# Patient Record
Sex: FEMALE | Race: WHITE
[De-identification: names, ages, dates, MRNs, and addresses within clinical notes are randomized per-mention and may not be internally consistent; named-entity substitution may affect disease eponyms.]

---

## 2023-08-24 ENCOUNTER — HOSPITAL ENCOUNTER
Dept: HOSPITAL 101 - MAMMO | Age: 63
Discharge: HOME | End: 2023-08-24
Payer: COMMERCIAL

## 2023-08-24 DIAGNOSIS — Z12.31: Primary | ICD-10-CM

## 2023-08-24 PROCEDURE — 77063 BREAST TOMOSYNTHESIS BI: CPT

## 2023-08-24 PROCEDURE — 77067 SCR MAMMO BI INCL CAD: CPT

## 2023-08-24 NOTE — MM_ITS
Patient:     TONY CHAPARRO     Exam Date:     2023 
:     1960          Gender:F     MRN:     [id number] 
Ordering :     DR JAMES LERNER M.D.     Admission #:     [id number] 
Family :          Order #:     [id number] 
     CLICK HERE TO VIEW EXAM 
  
RADIOLOGY REPORT 
  
PROCEDURE:     MM TOMOSYNTHESIS SCREENING BI 
  
COMPARISON:     [facility] SCREEN JOHN W CAD, 2017.  [facility] JOHN SCRN W CAD  
DIG, 2014. 
  
INDICATIONS:     Screening 
  
Calculator Name             NCI Breast Cancer Risk Assessment Tool  
5 Year Breast Cancer Risk     2.00% 
Lifetime Breast Cancer Risk     9.00% 
Personal Breast Cancer      No 
Personal Ovarian Cancer     No 
Treatments     None 
Family Cancers     None 
  
LOCATION:       [facility]  
  
BREAST COMPOSITION:     Heterogeneously dense,which may obscure small masses. 
  
FINDINGS:       
DIAGNOSTIC CATEGORY 2--BENIGN FINDING:   
  
RIGHT BREAST:  No significant suspicious finding.  Scattered benign-appearing  
lymph nodes are present.  No significant change has occurred.   
  
LEFT BREAST:  No significant suspicious finding.  Scattered benign-appearing  
lymph nodes are present.  No significant change has occurred.   
  
RECOMMENDATIONS:      
ROUTINE MAMMOGRAM AND CLINICAL EVALUATION IN 12 MONTHS.   
  
PLEASE NOTE:  A NORMAL MAMMOGRAM DOES NOT EXCLUDE THE POSSIBILITY OF BREAST  
CANCER.  A CLINICALLY SUSPICIOUS PALPABLE LUMP SHOULD BE BIOPSIED.   
  
  
Dictated by: Tone Cooper M.D. on 2023 at 14:29      
Approved by: Tone Cooper M.D. on 2023 at 14:32

## 2024-01-18 PROBLEM — E78.2 MIXED HYPERLIPIDEMIA: Status: ACTIVE | Noted: 2024-01-18

## 2024-01-18 PROBLEM — M25.561 CHRONIC PAIN OF BOTH KNEES: Status: ACTIVE | Noted: 2024-01-18

## 2024-01-18 PROBLEM — I10 ESSENTIAL HYPERTENSION, BENIGN: Status: ACTIVE | Noted: 2024-01-18

## 2024-01-18 PROBLEM — H40.1131 PRIMARY OPEN ANGLE GLAUCOMA (POAG) OF BOTH EYES, MILD STAGE: Status: ACTIVE | Noted: 2024-01-18

## 2024-01-18 PROBLEM — M25.562 CHRONIC PAIN OF BOTH KNEES: Status: ACTIVE | Noted: 2024-01-18

## 2024-01-18 PROBLEM — R06.00 DYSPNEA: Status: ACTIVE | Noted: 2024-01-18

## 2024-01-18 PROBLEM — H25.13 CATARACT, NUCLEAR SCLEROTIC, BOTH EYES: Status: ACTIVE | Noted: 2024-01-18

## 2024-01-18 PROBLEM — G89.29 CHRONIC PAIN OF BOTH KNEES: Status: ACTIVE | Noted: 2024-01-18

## 2024-01-18 PROBLEM — Z95.2 S/P TAVR (TRANSCATHETER AORTIC VALVE REPLACEMENT): Status: ACTIVE | Noted: 2024-01-18

## 2024-01-18 RX ORDER — VALSARTAN 320 MG/1
320 TABLET ORAL DAILY
COMMUNITY

## 2024-01-18 RX ORDER — GABAPENTIN 100 MG/1
2 CAPSULE ORAL 2 TIMES DAILY
COMMUNITY

## 2024-01-18 RX ORDER — ROPINIROLE 0.25 MG/1
0.25 TABLET, FILM COATED ORAL SEE ADMIN INSTRUCTIONS
COMMUNITY

## 2024-01-18 RX ORDER — ASPIRIN 81 MG/1
81 TABLET ORAL DAILY
COMMUNITY

## 2024-01-18 RX ORDER — MELOXICAM 15 MG/1
15 TABLET ORAL DAILY
COMMUNITY

## 2024-01-18 RX ORDER — DICYCLOMINE HYDROCHLORIDE 20 MG/1
20 TABLET ORAL
COMMUNITY

## 2024-01-18 RX ORDER — LEVOTHYROXINE SODIUM 100 UG/1
100 TABLET ORAL
COMMUNITY

## 2024-01-18 RX ORDER — TIZANIDINE HYDROCHLORIDE 4 MG/1
4 CAPSULE, GELATIN COATED ORAL NIGHTLY
COMMUNITY
End: 2024-02-23 | Stop reason: ALTCHOICE

## 2024-01-18 RX ORDER — AMLODIPINE BESYLATE 10 MG/1
10 TABLET ORAL DAILY
COMMUNITY

## 2024-01-18 RX ORDER — ATORVASTATIN CALCIUM 10 MG/1
10 TABLET, FILM COATED ORAL DAILY
COMMUNITY

## 2024-01-18 RX ORDER — FERROUS SULFATE 325(65) MG
65 TABLET, DELAYED RELEASE (ENTERIC COATED) ORAL
COMMUNITY
End: 2024-02-23 | Stop reason: ALTCHOICE

## 2024-01-18 RX ORDER — CHLORTHALIDONE 25 MG/1
25 TABLET ORAL DAILY
COMMUNITY
End: 2024-05-22

## 2024-02-23 ENCOUNTER — OFFICE VISIT (OUTPATIENT)
Dept: CARDIOLOGY | Facility: CLINIC | Age: 64
End: 2024-02-23
Payer: COMMERCIAL

## 2024-02-23 VITALS
HEIGHT: 68 IN | WEIGHT: 232 LBS | DIASTOLIC BLOOD PRESSURE: 68 MMHG | SYSTOLIC BLOOD PRESSURE: 138 MMHG | BODY MASS INDEX: 35.16 KG/M2 | HEART RATE: 72 BPM

## 2024-02-23 DIAGNOSIS — Z95.2 S/P TAVR (TRANSCATHETER AORTIC VALVE REPLACEMENT): ICD-10-CM

## 2024-02-23 DIAGNOSIS — I10 ESSENTIAL HYPERTENSION, BENIGN: Primary | ICD-10-CM

## 2024-02-23 DIAGNOSIS — R06.00 DYSPNEA, UNSPECIFIED TYPE: ICD-10-CM

## 2024-02-23 DIAGNOSIS — E78.2 MIXED HYPERLIPIDEMIA: ICD-10-CM

## 2024-02-23 PROCEDURE — 3075F SYST BP GE 130 - 139MM HG: CPT | Performed by: INTERNAL MEDICINE

## 2024-02-23 PROCEDURE — 3008F BODY MASS INDEX DOCD: CPT | Performed by: INTERNAL MEDICINE

## 2024-02-23 PROCEDURE — 99213 OFFICE O/P EST LOW 20 MIN: CPT | Performed by: INTERNAL MEDICINE

## 2024-02-23 PROCEDURE — 3078F DIAST BP <80 MM HG: CPT | Performed by: INTERNAL MEDICINE

## 2024-02-23 RX ORDER — PREDNISOLONE ACETATE 10 MG/ML
1 SUSPENSION/ DROPS OPHTHALMIC 4 TIMES DAILY
COMMUNITY

## 2024-02-23 RX ORDER — LATANOPROST 50 UG/ML
1 SOLUTION/ DROPS OPHTHALMIC NIGHTLY
COMMUNITY

## 2024-02-23 RX ORDER — BRIMONIDINE TARTRATE 2 MG/ML
1 SOLUTION/ DROPS OPHTHALMIC 2 TIMES DAILY
COMMUNITY

## 2024-02-23 RX ORDER — ACETAMINOPHEN 500 MG
500 TABLET ORAL EVERY 6 HOURS PRN
COMMUNITY

## 2024-02-23 ASSESSMENT — ENCOUNTER SYMPTOMS: HYPERTENSION: 1

## 2024-02-23 NOTE — PROGRESS NOTES
"Subjective   Latasha Gallegos is a 63 y.o. female       Chief Complaint    Follow-up; Hypertension     Patient is here for follow-up continue management for hypertension, hyperlipidemia, obesity and aortic valve replacement.  Since last time I saw her she denies any cardiac complaint.  She has lost close to 20 pounds.  She feels much better.  She denies lightheadedness, dizziness or syncope.  She remains active.    Assessment     1. Aortic stenosis status post TAVR done without any complication  2. Hypertension seems to have improved with better control  3. Hyperlipidemia  4. Obesity she lost close to 30 pounds  5.. I discussed with her endocarditis prophylaxis at great length     Plan     1. I advised patient to continue present medical regimen  2. I reviewed her previous lab  3. I encouraged her to lose weight, exercise and diet and improved address her risk factor aggressively  4. Endocarditis prophylaxis  5. Return back in 6 months     Hypertension         Review of Systems   All other systems reviewed and are negative.           Vitals:    02/23/24 1140 02/23/24 1155   BP: 144/66 138/68   BP Location: Left arm Left arm   Patient Position: Sitting Sitting   Pulse: 72    Weight: 105 kg (232 lb)    Height: 1.727 m (5' 8\")         Objective   Physical Exam  Constitutional:       Appearance: Normal appearance. She is normal weight.   HENT:      Nose: Nose normal.   Neck:      Vascular: No carotid bruit.   Cardiovascular:      Rate and Rhythm: Normal rate.      Pulses: Normal pulses.      Heart sounds: Normal heart sounds.   Pulmonary:      Effort: Pulmonary effort is normal.   Abdominal:      General: Bowel sounds are normal.      Palpations: Abdomen is soft.   Genitourinary:     Rectum: Normal.   Musculoskeletal:         General: Normal range of motion.      Cervical back: Normal range of motion.      Right lower leg: No edema.      Left lower leg: No edema.   Skin:     General: Skin is warm and dry.   Neurological: "      General: No focal deficit present.      Mental Status: She is alert.   Psychiatric:         Mood and Affect: Mood normal.         Behavior: Behavior normal.         Thought Content: Thought content normal.         Judgment: Judgment normal.         Allergies  Brimonidine, Dorzolamide, Dorzolamide-timolol, Levalbuterol, Penicillins, and Timolol     Current Medications    Current Outpatient Medications:     acetaminophen (Tylenol) 500 mg tablet, Take 1 tablet (500 mg) by mouth every 6 hours if needed for mild pain (1 - 3)., Disp: , Rfl:     amLODIPine (Norvasc) 10 mg tablet, Take 1 tablet (10 mg) by mouth once daily., Disp: , Rfl:     aspirin 81 mg EC tablet, Take 1 tablet (81 mg) by mouth once daily., Disp: , Rfl:     atorvastatin (Lipitor) 10 mg tablet, Take 1 tablet (10 mg) by mouth once daily., Disp: , Rfl:     brimonidine (AlphaGAN) 0.2 % ophthalmic solution, Administer 1 drop into both eyes 2 times a day., Disp: , Rfl:     chlorthalidone (Hygroton) 25 mg tablet, Take 1 tablet (25 mg) by mouth once daily., Disp: , Rfl:     dicyclomine (Bentyl) 20 mg tablet, Take 1 tablet (20 mg) by mouth 2 times a day., Disp: , Rfl:     gabapentin (Neurontin) 100 mg capsule, Take 2 capsules (200 mg) by mouth 2 times a day., Disp: , Rfl:     latanoprost (Xalatan) 0.005 % ophthalmic solution, Administer 1 drop into both eyes once daily at bedtime., Disp: , Rfl:     levothyroxine (Synthroid, Levoxyl) 100 mcg tablet, Take 1 tablet (100 mcg) by mouth once daily in the morning. Take before meals., Disp: , Rfl:     meloxicam (Mobic) 15 mg tablet, Take 1 tablet (15 mg) by mouth once daily., Disp: , Rfl:     prednisoLONE acetate (Pred-Forte) 1 % ophthalmic suspension, Administer 1 drop into both eyes 4 times a day., Disp: , Rfl:     rOPINIRole (Requip) 0.25 mg tablet, Take 1 tablet (0.25 mg) by mouth see administration instructions. Take 1 in the am, 1 at lunch, 2 at dinner and 2 at bedtime, Disp: , Rfl:     SITagliptin phosphate  (Januvia) 25 mg tablet, Take 1 tablet (25 mg) by mouth once daily., Disp: , Rfl:     valsartan (Diovan) 320 mg tablet, Take 1 tablet (320 mg) by mouth once daily., Disp: , Rfl:                      Assessment/Plan   1. Essential hypertension, benign  Follow Up In Cardiology      2. S/P TAVR (transcatheter aortic valve replacement)        3. Mixed hyperlipidemia        4. Dyspnea, unspecified type        5. BMI 35.0-35.9,adult                 Scribe Attestation  By signing my name below, ILatasha LPN  , Scribe   attest that this documentation has been prepared under the direction and in the presence of Chun Mobley MD.     Provider Attestation - Scribe documentation    All medical record entries made by the Scribe were at my direction and personally dictated by me. I have reviewed the chart and agree that the record accurately reflects my personal performance of the history, physical exam, discussion and plan.

## 2024-02-23 NOTE — LETTER
"February 23, 2024     Asad Benavides MD  [phone]  [facility] [address]    Patient: Latasha Gallegos   YOB: 1960   Date of Visit: 2/23/2024       Dear Dr. Asad Benavides MD:    Thank you for referring Latasha Gallegos to me for evaluation. Below are my notes for this consultation.  If you have questions, please do not hesitate to call me. I look forward to following your patient along with you.       Sincerely,     Chun Mobley MD      CC: No Recipients  ______________________________________________________________________________________    Subjective   Latasha Gallegos is a 63 y.o. female       Chief Complaint    Follow-up; Hypertension     Patient is here for follow-up continue management for hypertension, hyperlipidemia, obesity and aortic valve replacement.  Since last time I saw her she denies any cardiac complaint.  She has lost close to 20 pounds.  She feels much better.  She denies lightheadedness, dizziness or syncope.  She remains active.    Assessment     1. Aortic stenosis status post TAVR done without any complication  2. Hypertension seems to have improved with better control  3. Hyperlipidemia  4. Obesity she lost close to 30 pounds  5.. I discussed with her endocarditis prophylaxis at great length     Plan     1. I advised patient to continue present medical regimen  2. I reviewed her previous lab  3. I encouraged her to lose weight, exercise and diet and improved address her risk factor aggressively  4. Endocarditis prophylaxis  5. Return back in 6 months     Hypertension         Review of Systems   All other systems reviewed and are negative.           Vitals:    02/23/24 1140 02/23/24 1155   BP: 144/66 138/68   BP Location: Left arm Left arm   Patient Position: Sitting Sitting   Pulse: 72    Weight: 105 kg (232 lb)    Height: 1.727 m (5' 8\")         Objective   Physical Exam  Constitutional:       Appearance: Normal appearance. She is normal weight.   HENT:      " Nose: Nose normal.   Neck:      Vascular: No carotid bruit.   Cardiovascular:      Rate and Rhythm: Normal rate.      Pulses: Normal pulses.      Heart sounds: Normal heart sounds.   Pulmonary:      Effort: Pulmonary effort is normal.   Abdominal:      General: Bowel sounds are normal.      Palpations: Abdomen is soft.   Genitourinary:     Rectum: Normal.   Musculoskeletal:         General: Normal range of motion.      Cervical back: Normal range of motion.      Right lower leg: No edema.      Left lower leg: No edema.   Skin:     General: Skin is warm and dry.   Neurological:      General: No focal deficit present.      Mental Status: She is alert.   Psychiatric:         Mood and Affect: Mood normal.         Behavior: Behavior normal.         Thought Content: Thought content normal.         Judgment: Judgment normal.         Allergies  Brimonidine, Dorzolamide, Dorzolamide-timolol, Levalbuterol, Penicillins, and Timolol     Current Medications    Current Outpatient Medications:   •  acetaminophen (Tylenol) 500 mg tablet, Take 1 tablet (500 mg) by mouth every 6 hours if needed for mild pain (1 - 3)., Disp: , Rfl:   •  amLODIPine (Norvasc) 10 mg tablet, Take 1 tablet (10 mg) by mouth once daily., Disp: , Rfl:   •  aspirin 81 mg EC tablet, Take 1 tablet (81 mg) by mouth once daily., Disp: , Rfl:   •  atorvastatin (Lipitor) 10 mg tablet, Take 1 tablet (10 mg) by mouth once daily., Disp: , Rfl:   •  brimonidine (AlphaGAN) 0.2 % ophthalmic solution, Administer 1 drop into both eyes 2 times a day., Disp: , Rfl:   •  chlorthalidone (Hygroton) 25 mg tablet, Take 1 tablet (25 mg) by mouth once daily., Disp: , Rfl:   •  dicyclomine (Bentyl) 20 mg tablet, Take 1 tablet (20 mg) by mouth 2 times a day., Disp: , Rfl:   •  gabapentin (Neurontin) 100 mg capsule, Take 2 capsules (200 mg) by mouth 2 times a day., Disp: , Rfl:   •  latanoprost (Xalatan) 0.005 % ophthalmic solution, Administer 1 drop into both eyes once daily at  bedtime., Disp: , Rfl:   •  levothyroxine (Synthroid, Levoxyl) 100 mcg tablet, Take 1 tablet (100 mcg) by mouth once daily in the morning. Take before meals., Disp: , Rfl:   •  meloxicam (Mobic) 15 mg tablet, Take 1 tablet (15 mg) by mouth once daily., Disp: , Rfl:   •  prednisoLONE acetate (Pred-Forte) 1 % ophthalmic suspension, Administer 1 drop into both eyes 4 times a day., Disp: , Rfl:   •  rOPINIRole (Requip) 0.25 mg tablet, Take 1 tablet (0.25 mg) by mouth see administration instructions. Take 1 in the am, 1 at lunch, 2 at dinner and 2 at bedtime, Disp: , Rfl:   •  SITagliptin phosphate (Januvia) 25 mg tablet, Take 1 tablet (25 mg) by mouth once daily., Disp: , Rfl:   •  valsartan (Diovan) 320 mg tablet, Take 1 tablet (320 mg) by mouth once daily., Disp: , Rfl:                      Assessment/Plan   1. Essential hypertension, benign  Follow Up In Cardiology      2. S/P TAVR (transcatheter aortic valve replacement)        3. Mixed hyperlipidemia        4. Dyspnea, unspecified type        5. BMI 35.0-35.9,adult                 Scribe Attestation  By signing my name below, I, Brooklyn Woods LPN   attest that this documentation has been prepared under the direction and in the presence of Chun Mobley MD.     Provider Attestation - Scribe documentation    All medical record entries made by the Scribe were at my direction and personally dictated by me. I have reviewed the chart and agree that the record accurately reflects my personal performance of the history, physical exam, discussion and plan.

## 2024-02-23 NOTE — PATIENT INSTRUCTIONS
Please bring all medicines, vitamins, and herbal supplements with you when you come to the office.    Prescriptions will not be filled unless you are compliant with your follow up appointments or have a follow up appointment scheduled as per instruction of your physician. Refills should be requested at the time of your visit.     BMI was above normal measurement. Current weight: 105 kg (232 lb)  Weight change since last visit (-) denotes wt loss -28 lbs   Weight loss needed to achieve BMI 25: 67.9 Lbs  Weight loss needed to achieve BMI 30: 35.1 Lbs  Provided instructions on dietary changes.

## 2024-05-20 DIAGNOSIS — I10 ESSENTIAL HYPERTENSION, BENIGN: ICD-10-CM

## 2024-05-22 RX ORDER — CHLORTHALIDONE 25 MG/1
25 TABLET ORAL DAILY
Qty: 90 TABLET | Refills: 3 | Status: SHIPPED | OUTPATIENT
Start: 2024-05-22

## 2024-08-30 ENCOUNTER — APPOINTMENT (OUTPATIENT)
Dept: CARDIOLOGY | Facility: CLINIC | Age: 64
End: 2024-08-30
Payer: COMMERCIAL

## 2024-08-30 VITALS
BODY MASS INDEX: 33.68 KG/M2 | HEART RATE: 64 BPM | DIASTOLIC BLOOD PRESSURE: 64 MMHG | HEIGHT: 69 IN | SYSTOLIC BLOOD PRESSURE: 138 MMHG | WEIGHT: 227.4 LBS

## 2024-08-30 DIAGNOSIS — Z95.2 S/P TAVR (TRANSCATHETER AORTIC VALVE REPLACEMENT): ICD-10-CM

## 2024-08-30 DIAGNOSIS — E78.2 MIXED HYPERLIPIDEMIA: Primary | ICD-10-CM

## 2024-08-30 DIAGNOSIS — I10 ESSENTIAL HYPERTENSION, BENIGN: ICD-10-CM

## 2024-08-30 DIAGNOSIS — R06.00 DYSPNEA, UNSPECIFIED TYPE: ICD-10-CM

## 2024-08-30 DIAGNOSIS — Z78.9 NEVER SMOKED TOBACCO: ICD-10-CM

## 2024-08-30 PROCEDURE — 3075F SYST BP GE 130 - 139MM HG: CPT | Performed by: INTERNAL MEDICINE

## 2024-08-30 PROCEDURE — 3008F BODY MASS INDEX DOCD: CPT | Performed by: INTERNAL MEDICINE

## 2024-08-30 PROCEDURE — 99214 OFFICE O/P EST MOD 30 MIN: CPT | Performed by: INTERNAL MEDICINE

## 2024-08-30 PROCEDURE — 3078F DIAST BP <80 MM HG: CPT | Performed by: INTERNAL MEDICINE

## 2024-08-30 RX ORDER — VALSARTAN 320 MG/1
320 TABLET ORAL DAILY
Qty: 90 TABLET | Refills: 3 | Status: SHIPPED | OUTPATIENT
Start: 2024-08-30 | End: 2025-08-30

## 2024-08-30 RX ORDER — ATORVASTATIN CALCIUM 10 MG/1
10 TABLET, FILM COATED ORAL DAILY
Qty: 90 TABLET | Refills: 3 | Status: SHIPPED | OUTPATIENT
Start: 2024-08-30 | End: 2025-08-30

## 2024-08-30 NOTE — LETTER
"August 30, 2024     Asad Benavides MD  [address]  [facility] [address]    Patient: Latasha Gallegos   YOB: 1960   Date of Visit: 8/30/2024       Dear Dr. Asad Benavides MD:    Thank you for referring Latasha Gallegos to me for evaluation. Below are my notes for this consultation.  If you have questions, please do not hesitate to call me. I look forward to following your patient along with you.       Sincerely,     Chun Mobley MD      CC: No Recipients  ______________________________________________________________________________________    Subjective   Latasha Gallegos is a 64 y.o. female       Chief Complaint    Follow-up          HPI     Patient is here for follow-up continue management for history of aortic valve replacement, hypertension, hyperlipidemia and obesity.  Since last time I saw her she denies any change in cardiac status or symptoms.  She remains active.  She denies chest pain, palpitation, lightheadedness, dizziness or syncope.  She described functional class II.  Her activity is hampered by bilateral bilateral knee surgery.    Assessment     1. Aortic stenosis status post TAVR done without any compli 2 years ago  2. Hypertension controlled  3. Hyperlipidemia no recent lab  4. Obesity with recent 5 pound weight loss  5.. I discussed with her endocarditis prophylaxis at great length     Plan     1. I advised patient to continue present medical regimen  2. I advised her to repeat her lab work  3. I encouraged her to lose weight, exercise and diet and improved address her risk factor aggressively  4. Endocarditis prophylaxis  5.  I will see her back in 1 year we will plan to repeat her echo before next office visit  Review of Systems   All other systems reviewed and are negative.           Vitals:    08/30/24 1001   BP: 138/64   BP Location: Left arm   Patient Position: Sitting   Pulse: 64   Weight: 103 kg (227 lb 6.4 oz)   Height: 1.753 m (5' 9\")        Objective "   Physical Exam  Constitutional:       Appearance: Normal appearance.   HENT:      Nose: Nose normal.   Neck:      Vascular: No carotid bruit.   Cardiovascular:      Rate and Rhythm: Normal rate.      Pulses: Normal pulses.      Heart sounds: Murmur heard.      Systolic murmur is present with a grade of 2/6.   Pulmonary:      Effort: Pulmonary effort is normal.   Abdominal:      General: Bowel sounds are normal.      Palpations: Abdomen is soft.   Musculoskeletal:         General: Normal range of motion.      Cervical back: Normal range of motion.      Right lower leg: No edema.      Left lower leg: No edema.   Skin:     General: Skin is warm and dry.   Neurological:      General: No focal deficit present.      Mental Status: She is alert.   Psychiatric:         Mood and Affect: Mood normal.         Behavior: Behavior normal.         Thought Content: Thought content normal.         Judgment: Judgment normal.         Allergies  Brimonidine, Dorzolamide, Dorzolamide-timolol, Levalbuterol, Penicillins, and Timolol     Current Medications    Current Outpatient Medications:   •  acetaminophen (Tylenol) 500 mg tablet, Take 1 tablet (500 mg) by mouth every 6 hours if needed for mild pain (1 - 3)., Disp: , Rfl:   •  amLODIPine (Norvasc) 10 mg tablet, Take 1 tablet (10 mg) by mouth once daily., Disp: , Rfl:   •  aspirin 81 mg EC tablet, Take 1 tablet (81 mg) by mouth once daily., Disp: , Rfl:   •  brimonidine (AlphaGAN) 0.2 % ophthalmic solution, Administer 1 drop into both eyes 2 times a day., Disp: , Rfl:   •  chlorthalidone (Hygroton) 25 mg tablet, TAKE 1 TABLET BY MOUTH DAILY, Disp: 90 tablet, Rfl: 3  •  dicyclomine (Bentyl) 20 mg tablet, Take 1 tablet (20 mg) by mouth 2 times a day., Disp: , Rfl:   •  gabapentin (Neurontin) 100 mg capsule, Take 2 capsules (200 mg) by mouth 2 times a day., Disp: , Rfl:   •  latanoprost (Xalatan) 0.005 % ophthalmic solution, Administer 1 drop into both eyes once daily at bedtime., Disp: ,  Rfl:   •  levothyroxine (Synthroid, Levoxyl) 100 mcg tablet, Take 1 tablet (100 mcg) by mouth once daily in the morning. Take before meals., Disp: , Rfl:   •  meloxicam (Mobic) 15 mg tablet, Take 1 tablet (15 mg) by mouth once daily., Disp: , Rfl:   •  prednisoLONE acetate (Pred-Forte) 1 % ophthalmic suspension, Administer 1 drop into both eyes 4 times a day., Disp: , Rfl:   •  rOPINIRole (Requip) 0.25 mg tablet, Take 1 tablet (0.25 mg) by mouth see administration instructions. Take 1 in the am, 1 at lunch, 2 at dinner and 2 at bedtime, Disp: , Rfl:   •  atorvastatin (Lipitor) 10 mg tablet, Take 1 tablet (10 mg) by mouth once daily., Disp: 90 tablet, Rfl: 3  •  valsartan (Diovan) 320 mg tablet, Take 1 tablet (320 mg) by mouth once daily., Disp: 90 tablet, Rfl: 3                     Assessment/Plan   1. Mixed hyperlipidemia  atorvastatin (Lipitor) 10 mg tablet    Alanine Aminotransferase    Aspartate Aminotransferase    Lipid Panel    Alanine Aminotransferase    Aspartate Aminotransferase    Lipid Panel      2. Essential hypertension, benign  Follow Up In Cardiology    valsartan (Diovan) 320 mg tablet    Basic Metabolic Panel    Follow Up In Cardiology    Transthoracic Echo Complete    Basic Metabolic Panel      3. BMI 35.0-35.9,adult        4. Dyspnea, unspecified type        5. S/P TAVR (transcatheter aortic valve replacement)  Transthoracic Echo Complete      6. Never smoked tobacco                 Scribe Attestation  By signing my name below, Rachael GRAHAM LPN, Scribe   attest that this documentation has been prepared under the direction and in the presence of Chun Mobley MD.     Provider Attestation - Scribe documentation    All medical record entries made by the Scribe were at my direction and personally dictated by me. I have reviewed the chart and agree that the record accurately reflects my personal performance of the history, physical exam, discussion and plan.

## 2024-08-30 NOTE — PATIENT INSTRUCTIONS
Please bring all medicines, vitamins, and herbal supplements with you when you come to the office.    Prescriptions will not be filled unless you are compliant with your follow up appointments or have a follow up appointment scheduled as per instruction of your physician. Refills should be requested at the time of your visit.     BMI was above normal measurement. Current weight: 103 kg (227 lb 6.4 oz)  Weight change since last visit (-) denotes wt loss -4.6 lbs   Weight loss needed to achieve BMI 25: 58.5 Lbs  Weight loss needed to achieve BMI 30: 24.7 Lbs  Provided instructions on dietary changes  Provided instructions on exercise.    Echo one year and follow up afterwards  Lab work

## 2024-08-30 NOTE — PROGRESS NOTES
"Subjective   Latasha Gallegos is a 64 y.o. female       Chief Complaint    Follow-up          HPI     Patient is here for follow-up continue management for history of aortic valve replacement, hypertension, hyperlipidemia and obesity.  Since last time I saw her she denies any change in cardiac status or symptoms.  She remains active.  She denies chest pain, palpitation, lightheadedness, dizziness or syncope.  She described functional class II.  Her activity is hampered by bilateral bilateral knee surgery.    Assessment     1. Aortic stenosis status post TAVR done without any compli 2 years ago  2. Hypertension controlled  3. Hyperlipidemia no recent lab  4. Obesity with recent 5 pound weight loss  5.. I discussed with her endocarditis prophylaxis at great length     Plan     1. I advised patient to continue present medical regimen  2. I advised her to repeat her lab work  3. I encouraged her to lose weight, exercise and diet and improved address her risk factor aggressively  4. Endocarditis prophylaxis  5.  I will see her back in 1 year we will plan to repeat her echo before next office visit  Review of Systems   All other systems reviewed and are negative.           Vitals:    08/30/24 1001   BP: 138/64   BP Location: Left arm   Patient Position: Sitting   Pulse: 64   Weight: 103 kg (227 lb 6.4 oz)   Height: 1.753 m (5' 9\")        Objective   Physical Exam  Constitutional:       Appearance: Normal appearance.   HENT:      Nose: Nose normal.   Neck:      Vascular: No carotid bruit.   Cardiovascular:      Rate and Rhythm: Normal rate.      Pulses: Normal pulses.      Heart sounds: Murmur heard.      Systolic murmur is present with a grade of 2/6.   Pulmonary:      Effort: Pulmonary effort is normal.   Abdominal:      General: Bowel sounds are normal.      Palpations: Abdomen is soft.   Musculoskeletal:         General: Normal range of motion.      Cervical back: Normal range of motion.      Right lower leg: No edema. "      Left lower leg: No edema.   Skin:     General: Skin is warm and dry.   Neurological:      General: No focal deficit present.      Mental Status: She is alert.   Psychiatric:         Mood and Affect: Mood normal.         Behavior: Behavior normal.         Thought Content: Thought content normal.         Judgment: Judgment normal.         Allergies  Brimonidine, Dorzolamide, Dorzolamide-timolol, Levalbuterol, Penicillins, and Timolol     Current Medications    Current Outpatient Medications:     acetaminophen (Tylenol) 500 mg tablet, Take 1 tablet (500 mg) by mouth every 6 hours if needed for mild pain (1 - 3)., Disp: , Rfl:     amLODIPine (Norvasc) 10 mg tablet, Take 1 tablet (10 mg) by mouth once daily., Disp: , Rfl:     aspirin 81 mg EC tablet, Take 1 tablet (81 mg) by mouth once daily., Disp: , Rfl:     brimonidine (AlphaGAN) 0.2 % ophthalmic solution, Administer 1 drop into both eyes 2 times a day., Disp: , Rfl:     chlorthalidone (Hygroton) 25 mg tablet, TAKE 1 TABLET BY MOUTH DAILY, Disp: 90 tablet, Rfl: 3    dicyclomine (Bentyl) 20 mg tablet, Take 1 tablet (20 mg) by mouth 2 times a day., Disp: , Rfl:     gabapentin (Neurontin) 100 mg capsule, Take 2 capsules (200 mg) by mouth 2 times a day., Disp: , Rfl:     latanoprost (Xalatan) 0.005 % ophthalmic solution, Administer 1 drop into both eyes once daily at bedtime., Disp: , Rfl:     levothyroxine (Synthroid, Levoxyl) 100 mcg tablet, Take 1 tablet (100 mcg) by mouth once daily in the morning. Take before meals., Disp: , Rfl:     meloxicam (Mobic) 15 mg tablet, Take 1 tablet (15 mg) by mouth once daily., Disp: , Rfl:     prednisoLONE acetate (Pred-Forte) 1 % ophthalmic suspension, Administer 1 drop into both eyes 4 times a day., Disp: , Rfl:     rOPINIRole (Requip) 0.25 mg tablet, Take 1 tablet (0.25 mg) by mouth see administration instructions. Take 1 in the am, 1 at lunch, 2 at dinner and 2 at bedtime, Disp: , Rfl:     atorvastatin (Lipitor) 10 mg tablet,  Take 1 tablet (10 mg) by mouth once daily., Disp: 90 tablet, Rfl: 3    valsartan (Diovan) 320 mg tablet, Take 1 tablet (320 mg) by mouth once daily., Disp: 90 tablet, Rfl: 3                     Assessment/Plan   1. Mixed hyperlipidemia  atorvastatin (Lipitor) 10 mg tablet    Alanine Aminotransferase    Aspartate Aminotransferase    Lipid Panel    Alanine Aminotransferase    Aspartate Aminotransferase    Lipid Panel      2. Essential hypertension, benign  Follow Up In Cardiology    valsartan (Diovan) 320 mg tablet    Basic Metabolic Panel    Follow Up In Cardiology    Transthoracic Echo Complete    Basic Metabolic Panel      3. BMI 35.0-35.9,adult        4. Dyspnea, unspecified type        5. S/P TAVR (transcatheter aortic valve replacement)  Transthoracic Echo Complete      6. Never smoked tobacco                 Scribe Attestation  By signing my name below, Rachael GRAHAM LPN, Scribe   attest that this documentation has been prepared under the direction and in the presence of Chun Mobley MD.     Provider Attestation - Scribe documentation    All medical record entries made by the Scribe were at my direction and personally dictated by me. I have reviewed the chart and agree that the record accurately reflects my personal performance of the history, physical exam, discussion and plan.

## 2024-09-07 ENCOUNTER — HOSPITAL ENCOUNTER
Age: 64
Discharge: HOME | End: 2024-09-07
Payer: COMMERCIAL

## 2024-09-07 DIAGNOSIS — I10: ICD-10-CM

## 2024-09-07 DIAGNOSIS — Z79.4: ICD-10-CM

## 2024-09-07 DIAGNOSIS — E78.2: Primary | ICD-10-CM

## 2024-09-07 DIAGNOSIS — E03.9: ICD-10-CM

## 2024-09-07 DIAGNOSIS — E11.9: ICD-10-CM

## 2024-09-07 LAB
ADD MANUAL DIFF: NO
ALANINE AMINOTRANSFERASE: 23 U/L (ref 14–59)
ALBUMIN GLOBULIN RATIO: 1.1
ALBUMIN LEVEL: 3.8 G/DL (ref 3.4–5)
ALKALINE PHOSPHATASE: 81 U/L (ref 46–116)
ANION GAP: 16.2
ASPARTATE AMINO TRANSFERASE: 19 U/L (ref 15–37)
BLOOD UREA NITROGEN: 35 MG/DL (ref 7–18)
CALCIUM: 8.9 MG/DL (ref 8.5–10.1)
CARBON DIOXIDE: 21.6 MMOL/L (ref 21–32)
CHLORIDE: 105 MMOL/L (ref 98–107)
CHOLESTEROL: 149 MG/DL (ref ?–200)
CO2 BLD-SCNC: 21.6 MMOL/L (ref 21–32)
ESTIMATED GFR (AFRICAN AMERICA: >60 (ref 60–?)
ESTIMATED GFR (NON-AFRICAN AME: 53 (ref 60–?)
GLOBULIN: 3.5 G/DL
GLUCOSE BLD-MCNC: 125 MG/DL (ref 74–106)
HCT VFR BLD CALC: 32.8 % (ref 36–48)
HDL CHOLESTEROL: 61 MG/DL (ref 40–60)
HEMATOCRIT: 32.8 % (ref 36–48)
HEMOGLOBIN: 10.8 G/DL (ref 12–16)
IMMATURE GRANULOCYTES ABS AUTO: 0.02 10^3/UL (ref 0–0.03)
IMMATURE GRANULOCYTES PCT AUTO: 0.3 % (ref 0–0.5)
LYMPHOCYTES  ABSOLUTE AUTO: 1.7 10^3/UL (ref 1.2–3.8)
MCV RBC: 90.9 FL (ref 81–99)
MEAN CORPUSCULAR HEMOGLOBIN: 29.9 PG (ref 26.7–34)
MEAN CORPUSCULAR HGB CONC: 32.9 G/DL (ref 29.9–35.2)
MEAN CORPUSCULAR VOLUME: 90.9 FL (ref 81–99)
PLATELET # BLD: 251 10^3/UL (ref 150–450)
PLATELET COUNT: 251 10^3/UL (ref 150–450)
POTASSIUM SERPLBLD-SCNC: 3.8 MMOL/L (ref 3.5–5.1)
POTASSIUM: 3.8 MMOL/L (ref 3.5–5.1)
RED BLOOD COUNT: 3.61 10^6/UL (ref 4.2–5.4)
SODIUM BLD-SCNC: 139 MMOL/L (ref 136–145)
SODIUM: 139 MMOL/L (ref 136–145)
THYROID STIMULATING HORMONE: 1.65 UIU/ML (ref 0.36–3.74)
TOTAL PROTEIN: 7.3 G/DL (ref 6.4–8.2)
TRIGLYCERIDES: 117 MG/DL (ref ?–150)
VLDL CHOLESTEROL: 23.4 MG/DL
WBC # BLD: 6.1 10^3/UL (ref 4–11)
WHITE BLOOD COUNT: 6.1 10^3/UL (ref 4–11)

## 2024-09-07 PROCEDURE — 80053 COMPREHEN METABOLIC PANEL: CPT

## 2024-09-07 PROCEDURE — 84443 ASSAY THYROID STIM HORMONE: CPT

## 2024-09-07 PROCEDURE — 85025 COMPLETE CBC W/AUTO DIFF WBC: CPT

## 2024-09-07 PROCEDURE — 36415 COLL VENOUS BLD VENIPUNCTURE: CPT

## 2024-09-07 PROCEDURE — 80061 LIPID PANEL: CPT

## 2024-09-07 PROCEDURE — 84439 ASSAY OF FREE THYROXINE: CPT

## 2025-06-24 ENCOUNTER — HOSPITAL ENCOUNTER (EMERGENCY)
Age: 65
Discharge: HOME | End: 2025-06-24
Payer: COMMERCIAL

## 2025-06-24 VITALS
SYSTOLIC BLOOD PRESSURE: 196 MMHG | HEART RATE: 79 BPM | DIASTOLIC BLOOD PRESSURE: 69 MMHG | OXYGEN SATURATION: 100 % | TEMPERATURE: 98.1 F

## 2025-06-24 VITALS — BODY MASS INDEX: 33.5 KG/M2

## 2025-06-24 DIAGNOSIS — S01.81XA: Primary | ICD-10-CM

## 2025-06-24 DIAGNOSIS — Z23: ICD-10-CM

## 2025-06-24 DIAGNOSIS — S09.8XXA: ICD-10-CM

## 2025-06-24 DIAGNOSIS — S62.317A: ICD-10-CM
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
## 2025-06-24 PROCEDURE — 90715 TDAP VACCINE 7 YRS/> IM: CPT

## 2025-06-24 PROCEDURE — 12014 RPR F/E/E/N/L/M 5.1-7.5 CM: CPT

## 2025-06-24 PROCEDURE — 72125 CT NECK SPINE W/O DYE: CPT

## 2025-06-24 PROCEDURE — 70450 CT HEAD/BRAIN W/O DYE: CPT

## 2025-06-24 PROCEDURE — 73130 X-RAY EXAM OF HAND: CPT

## 2025-06-24 PROCEDURE — 99285 EMERGENCY DEPT VISIT HI MDM: CPT

## 2025-06-24 PROCEDURE — 29125 APPL SHORT ARM SPLINT STATIC: CPT

## 2025-06-24 PROCEDURE — 90471 IMMUNIZATION ADMIN: CPT

## 2025-07-11 DIAGNOSIS — I10 ESSENTIAL HYPERTENSION, BENIGN: ICD-10-CM

## 2025-07-11 RX ORDER — VALSARTAN 320 MG/1
320 TABLET ORAL DAILY
Qty: 90 TABLET | Refills: 1 | Status: SHIPPED | OUTPATIENT
Start: 2025-07-11 | End: 2026-07-11

## 2025-07-21 ENCOUNTER — RESULTS FOLLOW-UP (OUTPATIENT)
Dept: CARDIOLOGY | Facility: CLINIC | Age: 65
End: 2025-07-21

## 2025-07-21 ENCOUNTER — HOSPITAL ENCOUNTER (OUTPATIENT)
Dept: CARDIOLOGY | Facility: CLINIC | Age: 65
Discharge: HOME | End: 2025-07-21
Payer: COMMERCIAL

## 2025-07-21 VITALS
HEIGHT: 69 IN | BODY MASS INDEX: 33.62 KG/M2 | DIASTOLIC BLOOD PRESSURE: 68 MMHG | WEIGHT: 227 LBS | SYSTOLIC BLOOD PRESSURE: 142 MMHG

## 2025-07-21 DIAGNOSIS — Z95.2 S/P TAVR (TRANSCATHETER AORTIC VALVE REPLACEMENT): ICD-10-CM

## 2025-07-21 DIAGNOSIS — I10 ESSENTIAL HYPERTENSION, BENIGN: ICD-10-CM

## 2025-07-21 LAB
AORTIC VALVE MEAN GRADIENT: 16 MMHG
AORTIC VALVE PEAK VELOCITY: 2.67 M/S
AV PEAK GRADIENT: 28 MMHG
AVA (PEAK VEL): 1.37 CM2
AVA (VTI): 1.49 CM2
EJECTION FRACTION APICAL 4 CHAMBER: 75.7
EJECTION FRACTION: 63 %
LEFT ATRIUM VOLUME AREA LENGTH INDEX BSA: 29 ML/M2
LEFT VENTRICLE INTERNAL DIMENSION DIASTOLE: 5.31 CM (ref 3.5–6)
LEFT VENTRICULAR OUTFLOW TRACT DIAMETER: 2.09 CM
LV EJECTION FRACTION BIPLANE: 71 %
MITRAL VALVE E/A RATIO: 0.76
MITRAL VALVE E/E' RATIO: 11.38
RIGHT VENTRICLE FREE WALL PEAK S': 15.45 CM/S
RIGHT VENTRICLE PEAK SYSTOLIC PRESSURE: 22 MMHG
TRICUSPID ANNULAR PLANE SYSTOLIC EXCURSION: 1.5 CM

## 2025-07-21 PROCEDURE — 93306 TTE W/DOPPLER COMPLETE: CPT

## 2025-07-21 PROCEDURE — 93306 TTE W/DOPPLER COMPLETE: CPT | Performed by: INTERNAL MEDICINE

## 2025-07-21 NOTE — TELEPHONE ENCOUNTER
Result Communication    Resulted Orders   Transthoracic Echo Complete   Result Value Ref Range    AV mn grad 16 mmHg    AV pk sarwat 2.67 m/s    LV Biplane EF 71 %    LVOT diam 2.09 cm    MV E/A ratio 0.76     Tricuspid annular plane systolic excursion 1.5 cm    MV avg E/e' ratio 11.38     LA vol index A/L 29.0 ml/m2    LV EF 63 %    RV free wall pk S' 15.45 cm/s    RVSP 22 mmHg    LVIDd 5.31 cm    Aortic Valve Area by Continuity of Peak Velocity 1.37 cm2    AV pk grad 28 mmHg    Aortic Valve Area by Continuity of VTI 1.49 cm2    LV A4C EF 75.7     Narrative                   [address], [address], [place] [address]          Tel [phone] Fax [phone]    TRANSTHORACIC ECHOCARDIOGRAM REPORT    Patient Name:       NIKKIE JACKSON     Reading Physician:    [id number] Mobley MD  Study Date:         7/21/2025           Ordering Provider:    [id number] MOBLEY  MRN/PID:            [id number]            Fellow:  Accession#:         [id number]        Nurse:  Date of Birth/Age:  1960 / 64      Sonographer:          Corina townsend                                     RDCS, RVT  Gender Assigned at  F                   Additional Staff:  Birth:  Height:             175.26 cm           Admit Date:  Weight:             102.97 kg           Admission Status:     Outpatient  BSA / BMI:          2.18 m2 / 33.52     Department Location:  [facility]                      kg/[address]  Blood Pressure: 142 /68 mmHg    Study Type:    TRANSTHORACIC ECHO (TTE) COMPLETE  Diagnosis/ICD: Essential (primary) hypertension-I10; Presence of prosthetic                 heart valve-Z95.2  Indication:    #29 Evolut FX TAVR-9/2/2022, Hyperlipidemia, 2/6 Sytolic Murmur,                 Obesity  CPT Codes:      Echo Complete w Full [id number]   Study Detail: The following Echo studies were performed: 2D, M-Mode, Doppler and                color flow.       PHYSICIAN INTERPRETATION:  Left Ventricle: The left ventricular systolic function is normal with a visually estimated ejection fraction of 60-65%. There is mild concentric left ventricular hypertrophy. There are no regional wall motion abnormalities. The left ventricular cavity size is normal. There is mild increased septal and mildly increased posterior left ventricular wall thickness. Spectral Doppler shows a Grade I (impaired relaxation pattern) of left ventricular diastolic filling. Mild LVH.  Left Atrium: The left atrial size is normal.  Right Ventricle: The right ventricle is normal in size. There is normal right ventricular global systolic function.  Right Atrium: The right atrial size is normal.  Aortic Valve: There is a prosthetic aortic valve present. The aortic valve area by VTI is 1.49 cmï¿½ with a peak velocity of 2.67 m/s. The peak and mean gradients are 27 mmHg and 16 mmHg, respectively, with a dimensionless index of 0.44. There is no evidence of aortic valve regurgitation. The aortic valve is bioprosthetic. Peak gradient measured around 28 mmHg. Mean gradient around 15 mmHg. Calculated aortic valve area is 1.4 cmï¿½ consistent with good valve function.  Mitral Valve: The mitral valve is normal in structure. There is mild mitral valve regurgitation. The E Vmax is 0.76 m/s.  Tricuspid Valve: The tricuspid valve is structurally normal. There is trace to mild tricuspid regurgitation. The Doppler estimated right ventricular systolic pressure (RVSP) is within normal limits at 27 mmHg.  Pulmonic Valve: The pulmonic valve is structurally normal. There is no indication of pulmonic valve regurgitation.  Pericardium: No pericardial effusion noted.  Aorta: The aortic root is normal.       CONCLUSIONS:   1. The left ventricular systolic function is normal with a  visually estimated ejection fraction of 60-65%.   2. Spectral Doppler shows a Grade I (impaired relaxation pattern) of left ventricular diastolic filling.   3. Mild LVH.   4. There is normal right ventricular global systolic function.   5. Mild mitral valve regurgitation.   6. The Doppler estimated RVSP is within normal limits at 27 mmHg.   7. Trace to mild tricuspid regurgitation.   8. The aortic valve is bioprosthetic. Peak gradient measured around 28 mmHg. Mean gradient around 15 mmHg. Calculated aortic valve area is 1.4 cmï¿½ consistent with good valve function.   9. No significant changes noted when compared to previous study.    QUANTITATIVE DATA SUMMARY:     2D MEASUREMENTS:             Normal Ranges:  Ao Root s:       2.70 cm  LAs:             3.62 cm     (2.7-4.0cm)  RVIDd:           2.93 cm     (0.9-3.6cm)  IVSd:            1.28 cm     (0.6-1.1cm)  LVPWd:           1.20 cm     (0.6-1.1cm)  LVIDd:           5.31 cm     (3.9-5.9cm)  LVIDs:           3.21 cm  LV Mass Index:   123.2 g/m2  LVEDV Index:     50.24 ml/m2  LV % FS          39.6 %       LEFT ATRIUM:                 Normal Ranges:  LA Vol A4C:       62.8 ml    (22+/-6mL/m2)  LA Vol A2C:       57.6 ml  LA Vol BP:        63.3 ml  LA Vol Index A4C: 28.8ml/m2  LA Vol Index A2C: 26.4 ml/m2  LA Vol Index BP:  29.0 ml/m2  LA Vol A4C:       57.1 ml  LA Vol A2C:       53.4 ml  LA Vol Index BSA: 25.3 ml/m2       LV SYSTOLIC FUNCTION:                       Normal Ranges:  EF-A4C View:    76 % (>=55%)  EF-A2C View:    67 %  EF-Biplane:     71 %  EF-Visual:      63 %  LV EF Reported: 63 %       LV DIASTOLIC FUNCTION:           Normal Ranges:  MV Peak E:             0.76 m/s  (0.7-1.2 m/s)  MV Peak A:             1.00 m/s  (0.42-0.7 m/s)  E/A Ratio:             0.76      (1.0-2.2)  MV e'                  0.068 m/s (>8.0)  MV lateral e'          0.07 m/s  MV medial e'           0.07 m/s  E/e' Ratio:            11.12     (<8.0)       MITRAL VALVE:          Normal  Ranges:  MV DT:        317 msec (150-240msec)       MITRAL INSUFFICIENCY:             Normal Ranges:  MR Vmax:              472.60 cm/s       AORTIC VALVE:                      Normal Ranges:  AoV Vmax:                2.67 m/s  (<=1.7m/s)  AoV Peak P.5 mmHg (<20mmHg)  AoV Mean PG:             15.8 mmHg (1.7-11.5mmHg)  LVOT Max [place]asper:            1.07 m/s  (<=1.1m/s)  AoV VTI:                 66.71 cm  (18-25cm)  LVOT VTI:                29.06 cm  LVOT Diameter:           2.09 cm   (1.8-2.4cm)  AoV Area, VTI:           1.49 cm2  (2.5-5.5cm2)  AoV Area,Vmax:           1.37 cm2  (2.5-4.5cm2)  AoV Dimensionless Index: 0.44       RIGHT VENTRICLE:  RV Basal 4.01 cm  RV Mid   4.00 cm  RV Major 5.6 cm  TAPSE:   15.2 mm  RV s'    0.15 m/s       TRICUSPID VALVE/RVSP:          Normal Ranges:  Peak TR Velocity:     2.17 m/s  Est. RA Pressure:     8 mmHg  RV Syst Pressure:     27 mmHg  (< 30mmHg)  IVC Diam:             2.42 cm       PULMONIC VALVE:          Normal Ranges:  RVOT Vmax:      1.09 m/s (0.6-0.9m/s)       AORTA:  Asc Ao Diam 3.53 cm       [id number] Chun Mobley MD  Electronically signed on 2025 at 3:14:48 PM         ** Final **         3:40 PM    Left detailed vm advising patient of normal results. No New Orders. Requested call back with any further concerns.

## 2025-07-21 NOTE — TELEPHONE ENCOUNTER
----- Message from Chun Mobley sent at 7/21/2025  3:21 PM EDT -----  Advise heart function looks good and the aortic valve looks great  ----- Message -----  From: Ozzie Syngo - Cardiology Results In  Sent: 7/21/2025   3:14 PM EDT  To: Chun Mobley MD

## 2025-08-25 ENCOUNTER — APPOINTMENT (OUTPATIENT)
Dept: CARDIOLOGY | Facility: CLINIC | Age: 65
End: 2025-08-25
Payer: COMMERCIAL

## 2025-08-29 ENCOUNTER — APPOINTMENT (OUTPATIENT)
Dept: CARDIOLOGY | Facility: CLINIC | Age: 65
End: 2025-08-29
Payer: COMMERCIAL

## 2025-08-29 VITALS
HEART RATE: 60 BPM | DIASTOLIC BLOOD PRESSURE: 58 MMHG | WEIGHT: 236 LBS | BODY MASS INDEX: 34.96 KG/M2 | SYSTOLIC BLOOD PRESSURE: 132 MMHG | HEIGHT: 69 IN

## 2025-08-29 DIAGNOSIS — Z95.2 S/P TAVR (TRANSCATHETER AORTIC VALVE REPLACEMENT): Primary | ICD-10-CM

## 2025-08-29 DIAGNOSIS — Z78.9 NEVER SMOKED TOBACCO: ICD-10-CM

## 2025-08-29 DIAGNOSIS — E78.2 MIXED HYPERLIPIDEMIA: ICD-10-CM

## 2025-08-29 DIAGNOSIS — I10 ESSENTIAL HYPERTENSION, BENIGN: ICD-10-CM

## 2025-08-29 PROCEDURE — 99213 OFFICE O/P EST LOW 20 MIN: CPT | Performed by: INTERNAL MEDICINE

## 2025-08-29 PROCEDURE — 3008F BODY MASS INDEX DOCD: CPT | Performed by: INTERNAL MEDICINE

## 2025-08-29 PROCEDURE — 3075F SYST BP GE 130 - 139MM HG: CPT | Performed by: INTERNAL MEDICINE

## 2025-08-29 PROCEDURE — 3078F DIAST BP <80 MM HG: CPT | Performed by: INTERNAL MEDICINE

## 2025-08-29 PROCEDURE — 1036F TOBACCO NON-USER: CPT | Performed by: INTERNAL MEDICINE

## 2025-08-29 RX ORDER — LOTEPREDNOL ETABONATE 5 MG/ML
SUSPENSION/ DROPS OPHTHALMIC
COMMUNITY
Start: 2025-05-28

## 2025-08-29 RX ORDER — CHLORTHALIDONE 25 MG/1
25 TABLET ORAL DAILY
Qty: 90 TABLET | Refills: 3 | Status: SHIPPED | OUTPATIENT
Start: 2025-08-29

## 2025-08-29 RX ORDER — ROPINIROLE 1 MG/1
1 TABLET, FILM COATED ORAL NIGHTLY
COMMUNITY
Start: 2025-06-05

## 2025-08-29 RX ORDER — VALACYCLOVIR HYDROCHLORIDE 500 MG/1
500 TABLET, FILM COATED ORAL 2 TIMES DAILY
COMMUNITY
Start: 2025-08-06

## 2025-08-29 RX ORDER — MELOXICAM 7.5 MG/1
7.5 TABLET ORAL DAILY
COMMUNITY
Start: 2025-08-04

## 2025-08-29 RX ORDER — ACETAZOLAMIDE 500 MG/1
500 CAPSULE, EXTENDED RELEASE ORAL 2 TIMES DAILY
COMMUNITY
Start: 2024-01-08

## 2026-09-25 ENCOUNTER — APPOINTMENT (OUTPATIENT)
Dept: CARDIOLOGY | Facility: CLINIC | Age: 66
End: 2026-09-25
Payer: COMMERCIAL